# Patient Record
(demographics unavailable — no encounter records)

---

## 2025-01-28 NOTE — ASSESSMENT
[FreeTextEntry1] : Assessment and Plan: - Obstructive Sleep Apnea : Patient has a history of obstructive sleep apnea and is using CPAP therapy consistently. She reports improved sleep quality and reduced daytime fatigue, though some tiredness persists. - Continue current CPAP therapy - Encourage patient to focus on increasing total sleep time, aiming for 7.5-8 hours per night - Discussed possibility of overnight sleep study followed by daytime nap test (MSLT) if symptoms persist or worsen - Annual follow-up to monitor CPAP efficacy and overall sleep quality - Patient education provided on the importance of consistent sleep schedule and adequate sleep duration  - Insufficient Sleep Syndrome : Patient reports getting less than optimal sleep duration due to family commitments and early wake times. - Advised patient to prioritize sleep and aim for 7.5-8 hours of sleep per night  - Recommended maintaining a consistent sleep schedule, even on weekends  - Encouraged patient to evaluate and adjust daily routines to allow for more sleep time

## 2025-01-28 NOTE — HISTORY OF PRESENT ILLNESS
[TextBox_4] : - History of Present Illness : Yesenia Hayden, a patient with a history of obstructive sleep apnea, presents for her yearly CPAP evaluation. She reports using her CPAP equipment faithfully every night without any ongoing issues. The patient states that she sleeps better with the CPAP and experiences dreams. She estimates her sleep quality has improved, though she still wakes up to use the bathroom at night. The patient reports feeling less physically exhausted compared to before CPAP therapy, but still experiences some tiredness. She no longer requires naps as frequently as before, only occasionally when she overexerts herself. The patient describes herself as a light sleeper since having children, able to hear everything at night. She mentions that her  also has sleep apnea and recently started using a CPAP mask, which has improved her sleep environment.

## 2025-01-28 NOTE — PHYSICAL EXAM
[No Acute Distress] : no acute distress [Normal Oropharynx] : normal oropharynx [Low Lying Soft Palate] : low lying soft palate [Enlarged Base of the Tongue] : enlarged base of the tongue [Normal Appearance] : normal appearance [No Neck Mass] : no neck mass [Normal Rate/Rhythm] : normal rate/rhythm [Normal S1, S2] : normal s1, s2 [No Murmurs] : no murmurs [No Resp Distress] : no resp distress [Clear to Auscultation Bilaterally] : clear to auscultation bilaterally [No Abnormalities] : no abnormalities [Benign] : benign [Normal Gait] : normal gait [No Clubbing] : no clubbing [No Cyanosis] : no cyanosis [No Edema] : no edema [FROM] : FROM [Normal Color/ Pigmentation] : normal color/ pigmentation [No Focal Deficits] : no focal deficits [Oriented x3] : oriented x3 [Normal Affect] : normal affect